# Patient Record
Sex: FEMALE | Race: WHITE | NOT HISPANIC OR LATINO | Employment: FULL TIME | URBAN - METROPOLITAN AREA
[De-identification: names, ages, dates, MRNs, and addresses within clinical notes are randomized per-mention and may not be internally consistent; named-entity substitution may affect disease eponyms.]

---

## 2019-07-24 ENCOUNTER — TELEPHONE (OUTPATIENT)
Dept: SCHEDULING | Facility: IMAGING CENTER | Age: 29
End: 2019-07-24

## 2019-08-21 ENCOUNTER — OFFICE VISIT (OUTPATIENT)
Dept: MEDICAL GROUP | Facility: PHYSICIAN GROUP | Age: 29
End: 2019-08-21
Payer: OTHER GOVERNMENT

## 2019-08-21 VITALS
HEART RATE: 88 BPM | BODY MASS INDEX: 28.25 KG/M2 | TEMPERATURE: 98.7 F | WEIGHT: 180 LBS | RESPIRATION RATE: 18 BRPM | DIASTOLIC BLOOD PRESSURE: 80 MMHG | SYSTOLIC BLOOD PRESSURE: 130 MMHG | HEIGHT: 67 IN | OXYGEN SATURATION: 100 %

## 2019-08-21 DIAGNOSIS — Z30.46 SURVEILLANCE OF PREVIOUSLY PRESCRIBED IMPLANTABLE SUBDERMAL CONTRACEPTIVE: ICD-10-CM

## 2019-08-21 DIAGNOSIS — N92.1 MENORRHAGIA WITH IRREGULAR CYCLE: ICD-10-CM

## 2019-08-21 DIAGNOSIS — G43.109 MIGRAINE WITH AURA AND WITHOUT STATUS MIGRAINOSUS, NOT INTRACTABLE: ICD-10-CM

## 2019-08-21 DIAGNOSIS — Z30.09 BIRTH CONTROL COUNSELING: ICD-10-CM

## 2019-08-21 PROCEDURE — 99203 OFFICE O/P NEW LOW 30 MIN: CPT | Performed by: FAMILY MEDICINE

## 2019-08-21 RX ORDER — SUMATRIPTAN 25 MG/1
25-100 TABLET, FILM COATED ORAL
Qty: 10 TAB | Refills: 1 | Status: SHIPPED | OUTPATIENT
Start: 2019-08-21

## 2019-08-21 RX ORDER — ONDANSETRON 8 MG/1
8 TABLET, ORALLY DISINTEGRATING ORAL EVERY 8 HOURS PRN
COMMUNITY
End: 2019-12-19

## 2019-08-21 RX ORDER — SUMATRIPTAN 25 MG/1
25-100 TABLET, FILM COATED ORAL
COMMUNITY
End: 2019-08-21 | Stop reason: SDUPTHER

## 2019-08-21 SDOH — HEALTH STABILITY: MENTAL HEALTH: HOW OFTEN DO YOU HAVE A DRINK CONTAINING ALCOHOL?: NEVER

## 2019-08-21 ASSESSMENT — PATIENT HEALTH QUESTIONNAIRE - PHQ9: CLINICAL INTERPRETATION OF PHQ2 SCORE: 0

## 2019-08-21 NOTE — PROGRESS NOTES
"cc: Birth control      Subjective:     Ayaka Mars is a 29 y.o. female presenting for the following:     Nexplanon in her left arm that did  in February.  It was first placed but only for birth control but also for her heavy menses.  This has done very well and she stopped having vaginal bleeding.  But over the last few months she has had light but almost continuous vaginal spotting and she would like to have another Nexplanon.  She does have 2 children and she is not sure if she would like to have another in the future.  Negative side effects with the Nexplanon. Patient is not currently sexually active.    Migraine-patient has a long history of migraine since young adulthood.  Her grandmother also gets migraines.  She has tried many different medications while in the  for this.  Some of the prophylactic one such as propranolol would work at first but then would stop working after only a few weeks.  She also was getting Botox injections which would only work for a few weeks as well.  She is currently getting about 1-2 severe migraines with nausea, photophobia, debilitating pain per week but she also has more mild headaches, about 4/week.     Review of systems:  All others reviewed and are negative.       Current Outpatient Medications:   •  ondansetron (ZOFRAN ODT) 8 MG TABLET DISPERSIBLE, Take 8 mg by mouth every 8 hours as needed for Nausea., Disp: , Rfl:   •  SUMAtriptan (IMITREX) 25 MG Tab tablet, Take 1-4 Tabs by mouth Once PRN for Migraine., Disp: 10 Tab, Rfl: 1    Allergies, past medical history, past surgical history, family history, social history reviewed and updated    Objective:     Vitals: /80 (BP Location: Right arm, Patient Position: Sitting, BP Cuff Size: Adult)   Pulse 88   Temp 37.1 °C (98.7 °F) (Temporal)   Resp 18   Ht 1.702 m (5' 7\")   Wt 81.6 kg (180 lb)   SpO2 100%   BMI 28.19 kg/m²   General: Alert, pleasant, NAD  HEENT: Normocephalic.   EOMI, no icterus or " pallor.  Conjunctivae and lids normal. External ears normal. Oropharynx non-erythematous, mucous membranes moist.    Neck supple.  No thyromegaly or masses palpated. No cervical or supraclavicular lymphadenopathy.  Heart: Regular rate and rhythm.  S1 and S2 normal.  No murmurs appreciated.  Respiratory: Normal respiratory effort.  Clear to auscultation bilaterally.  Abdomen: Non-distended, soft  Skin: Warm, dry, no rashes.  Left arm with Nexplanon easily palpable.  Musculoskeletal: Gait is normal.  Moves all extremities well.  Extremities: No leg edema.    Neurological: gait is normal, CN2-12 grossly intact  Psych:  Affect is normal, judgement is good, memory is intact, grooming is appropriate.    Assessment/Plan:     Ayaka was seen today for hospital follow-up and menstrual problem.    Diagnoses and all orders for this visit:    Menorrhagia with irregular cycle/Birth control counseling/Surveillance of previously prescribed implantable subdermal contraceptive: Patient would like a new Nexplanon placed as her previous one is . Will order and schedule to have out with the old and in with the new.       Migraine with aura and without status migrainosus, not intractable: poorly controlled problem. Patient has tried many treatments in the past. Is getting some relief with imitrex currently.   -     REFERRAL TO NEUROLOGY    Other orders  -     SUMAtriptan (IMITREX) 25 MG Tab tablet; Take 1-4 Tabs by mouth Once PRN for Migraine.      Return in about 4 weeks (around 2019), or if symptoms worsen or fail to improve.

## 2019-08-26 ENCOUNTER — TELEPHONE (OUTPATIENT)
Dept: MEDICAL GROUP | Facility: PHYSICIAN GROUP | Age: 29
End: 2019-08-26

## 2019-08-26 NOTE — TELEPHONE ENCOUNTER
Called patient several times for Nexplanon paperwork. No answer and no voicemail. I just the paperwork signed. I will call again tomorrow. I will have the form up front with the PARs.

## 2019-09-17 ENCOUNTER — TELEPHONE (OUTPATIENT)
Dept: MEDICAL GROUP | Facility: PHYSICIAN GROUP | Age: 29
End: 2019-09-17

## 2019-09-17 NOTE — TELEPHONE ENCOUNTER
I received another letter from [employer], they still dont have the training ID. This patient needs her Nexplanon removed and replaced. Do you want to refer to OBGYN? Please advise.

## 2019-09-18 NOTE — TELEPHONE ENCOUNTER
Doroteo, we are sending this patient to Dr. Shay for Nexplanon. Dr. Cardenas can't get the Nexplanon. Can you order it and let me know when to schedule the patient?

## 2019-09-20 NOTE — TELEPHONE ENCOUNTER
I sent you an email with the nexplanon order form. If you can go ahead and get it filled out and get the patient's signature / etc. Once we get the actual nexplanon in we can get her on Dr CAMPBELL's schedule. Thank you.

## 2019-10-01 ENCOUNTER — OFFICE VISIT (OUTPATIENT)
Dept: MEDICAL GROUP | Facility: PHYSICIAN GROUP | Age: 29
End: 2019-10-01
Payer: OTHER GOVERNMENT

## 2019-10-01 VITALS
HEIGHT: 67 IN | SYSTOLIC BLOOD PRESSURE: 110 MMHG | DIASTOLIC BLOOD PRESSURE: 70 MMHG | WEIGHT: 180 LBS | BODY MASS INDEX: 28.25 KG/M2 | TEMPERATURE: 97.4 F | RESPIRATION RATE: 18 BRPM | OXYGEN SATURATION: 98 % | HEART RATE: 88 BPM

## 2019-10-01 DIAGNOSIS — Z87.42 HISTORY OF ABNORMAL CERVICAL PAP SMEAR: ICD-10-CM

## 2019-10-01 DIAGNOSIS — N63.0 BREAST MASS IN FEMALE: ICD-10-CM

## 2019-10-01 DIAGNOSIS — Z80.9 FAMILY HISTORY OF CANCER: ICD-10-CM

## 2019-10-01 DIAGNOSIS — Z30.09 BIRTH CONTROL COUNSELING: ICD-10-CM

## 2019-10-01 DIAGNOSIS — Z98.890 HISTORY OF BREAST BIOPSY: ICD-10-CM

## 2019-10-01 DIAGNOSIS — N92.1 MENORRHAGIA WITH IRREGULAR CYCLE: ICD-10-CM

## 2019-10-01 DIAGNOSIS — N94.6 DYSMENORRHEA: ICD-10-CM

## 2019-10-01 LAB
INT CON NEG: NORMAL
INT CON POS: NORMAL
POC URINE PREGNANCY TEST: NORMAL

## 2019-10-01 PROCEDURE — 99214 OFFICE O/P EST MOD 30 MIN: CPT | Performed by: FAMILY MEDICINE

## 2019-10-01 PROCEDURE — 81025 URINE PREGNANCY TEST: CPT | Performed by: FAMILY MEDICINE

## 2019-10-01 RX ORDER — MEDROXYPROGESTERONE ACETATE 150 MG/ML
150 INJECTION, SUSPENSION INTRAMUSCULAR ONCE
Status: COMPLETED | OUTPATIENT
Start: 2019-10-01 | End: 2019-10-01

## 2019-10-01 RX ADMIN — MEDROXYPROGESTERONE ACETATE 150 MG: 150 INJECTION, SUSPENSION INTRAMUSCULAR at 17:10

## 2019-10-01 NOTE — PROGRESS NOTES
"cc: Sister diagnosed with ovarian cancer.      Subjective:     Ayaka Mars is a 29 y.o. female presenting for the following:     Patient did have a breast mass and had a biopsy with a clip placed and she was supposed to have a follow-up mammogram in 1 year but was lost to follow-up.  She has not had any new symptoms related to this.  She feels well.  No weight loss, night sweats, breast pain.    Family History of cancers: Both grandmothers with breast cancer. Then mother with breast cancer in her 40s. Now, sister diagnosed with ovarian cancer and she is 30 years old.     Patient does have a long history of heavy and irregular menses.  These are very severe and cause her to miss work etc. She has had an abnormal Pap smear in the past and believes she was told to have a repeat pap smear each year. She is due for this.     She is overdue for another Nexplanon.  She did like this form of birth control.  She does have migraine with aura.  She no longer smokes.      Review of systems:  All others reviewed and are negative.       Current Outpatient Medications:   •  ondansetron (ZOFRAN ODT) 8 MG TABLET DISPERSIBLE, Take 8 mg by mouth every 8 hours as needed for Nausea., Disp: , Rfl:   •  SUMAtriptan (IMITREX) 25 MG Tab tablet, Take 1-4 Tabs by mouth Once PRN for Migraine., Disp: 10 Tab, Rfl: 1    Allergies, past medical history, past surgical history, family history, social history reviewed and updated    Objective:     Vitals: /70 (BP Location: Left arm, Patient Position: Sitting, BP Cuff Size: Adult)   Pulse 88   Temp 36.3 °C (97.4 °F) (Temporal)   Resp 18   Ht 1.702 m (5' 7\")   Wt 81.6 kg (180 lb)   SpO2 98%   BMI 28.19 kg/m²   General: Alert, pleasant, NAD  HEENT: Normocephalic.   EOMI, no icterus or pallor.  Conjunctivae and lids normal. External ears normal. Oropharynx non-erythematous, mucous membranes moist.    Neck supple.  No thyromegaly or masses palpated. No cervical or supraclavicular " lymphadenopathy.  Heart: Regular rate and rhythm.  S1 and S2 normal.  No murmurs appreciated.  Respiratory: Normal respiratory effort.  Clear to auscultation bilaterally.  Abdomen: Non-distended, soft  Skin: Warm, dry, no rashes.  Musculoskeletal: Gait is normal.  Moves all extremities well.  Extremities: No leg edema.    Neurological: No tremors, sensation grossly intact,  tone/strength normal, gait is normal, CN2-12 grossly intact  Psych:  Affect is normal, judgement is good, memory is intact, grooming is appropriate.    Assessment/Plan:     Ayaka was seen today for other.    Diagnoses and all orders for this visit:    Patient with menorrhagia, dysmenorrhea.  Will obtain a pelvic ultrasound and ensure normal thyroid function, kidney function, no anemia.  We will also refer to gynecology for this problem and for her history of abnormal Pap smear.  Menorrhagia with irregular cycle  -     REFERRAL TO GYNECOLOGY  -     US-PELVIC COMPLETE (TRANSABDOMINAL/TRANSVAGINAL) (COMBO); Future  -     TSH; Future  -     FREE THYROXINE; Future  -     CBC WITHOUT DIFFERENTIAL; Future  -     Comp Metabolic Panel; Future  Dysmenorrhea  -     REFERRAL TO GYNECOLOGY  -     US-PELVIC COMPLETE (TRANSABDOMINAL/TRANSVAGINAL) (COMBO); Future  History of abnormal cervical Pap smear  -     REFERRAL TO GYNECOLOGY    History of breast biopsy: Patient does have a history of breast biopsy and was told to follow-up with a mammogram yearly that she is due for now.  She does have a clip in place.  -     MA-DIAGNOSTIC MAMMO W/CAD-BILAT; Future    Breast mass in female  -     MA-DIAGNOSTIC MAMMO W/CAD-BILAT; Future    Family history of cancer: Both grandmothers with breast cancer. Then mother with breast cancer in her 40s. Now, sister diagnosed with ovarian cancer and she is 30 years old.   -     REFERRAL TO GENETICS    Birth control counseling: Patient is overdue for a Nexplanon replacement.  Point-of-care pregnancy negative today so will bridge  to her next Nexplanon appointment with Depo-Provera given today.  -     medroxyPROGESTERone (DEPO-PROVERA) injection 150 mg  -     POCT PREGNANCY    Return if symptoms worsen or fail to improve.

## 2019-10-10 ENCOUNTER — HOSPITAL ENCOUNTER (OUTPATIENT)
Dept: LAB | Facility: MEDICAL CENTER | Age: 29
End: 2019-10-10
Attending: FAMILY MEDICINE
Payer: OTHER GOVERNMENT

## 2019-10-10 DIAGNOSIS — N92.1 MENORRHAGIA WITH IRREGULAR CYCLE: ICD-10-CM

## 2019-10-10 LAB
ALBUMIN SERPL BCP-MCNC: 4.8 G/DL (ref 3.2–4.9)
ALBUMIN/GLOB SERPL: 1.4 G/DL
ALP SERPL-CCNC: 70 U/L (ref 30–99)
ALT SERPL-CCNC: 15 U/L (ref 2–50)
ANION GAP SERPL CALC-SCNC: 10 MMOL/L (ref 0–11.9)
AST SERPL-CCNC: 15 U/L (ref 12–45)
BILIRUB SERPL-MCNC: 0.5 MG/DL (ref 0.1–1.5)
BUN SERPL-MCNC: 20 MG/DL (ref 8–22)
CALCIUM SERPL-MCNC: 10.5 MG/DL (ref 8.5–10.5)
CHLORIDE SERPL-SCNC: 105 MMOL/L (ref 96–112)
CO2 SERPL-SCNC: 25 MMOL/L (ref 20–33)
CREAT SERPL-MCNC: 0.93 MG/DL (ref 0.5–1.4)
ERYTHROCYTE [DISTWIDTH] IN BLOOD BY AUTOMATED COUNT: 41.1 FL (ref 35.9–50)
FASTING STATUS PATIENT QL REPORTED: NORMAL
GLOBULIN SER CALC-MCNC: 3.5 G/DL (ref 1.9–3.5)
GLUCOSE SERPL-MCNC: 81 MG/DL (ref 65–99)
HCT VFR BLD AUTO: 49.7 % (ref 37–47)
HGB BLD-MCNC: 15.8 G/DL (ref 12–16)
MCH RBC QN AUTO: 27.1 PG (ref 27–33)
MCHC RBC AUTO-ENTMCNC: 31.8 G/DL (ref 33.6–35)
MCV RBC AUTO: 85.4 FL (ref 81.4–97.8)
PLATELET # BLD AUTO: 298 K/UL (ref 164–446)
PMV BLD AUTO: 9.7 FL (ref 9–12.9)
POTASSIUM SERPL-SCNC: 3.9 MMOL/L (ref 3.6–5.5)
PROT SERPL-MCNC: 8.3 G/DL (ref 6–8.2)
RBC # BLD AUTO: 5.82 M/UL (ref 4.2–5.4)
SODIUM SERPL-SCNC: 140 MMOL/L (ref 135–145)
T4 FREE SERPL-MCNC: 1.52 NG/DL (ref 0.53–1.43)
TSH SERPL DL<=0.005 MIU/L-ACNC: 1.15 UIU/ML (ref 0.38–5.33)
WBC # BLD AUTO: 7 K/UL (ref 4.8–10.8)

## 2019-10-10 PROCEDURE — 80053 COMPREHEN METABOLIC PANEL: CPT

## 2019-10-10 PROCEDURE — 85027 COMPLETE CBC AUTOMATED: CPT

## 2019-10-10 PROCEDURE — 84439 ASSAY OF FREE THYROXINE: CPT

## 2019-10-10 PROCEDURE — 84443 ASSAY THYROID STIM HORMONE: CPT

## 2019-10-10 PROCEDURE — 36415 COLL VENOUS BLD VENIPUNCTURE: CPT

## 2019-10-11 DIAGNOSIS — R79.89 ELEVATED SERUM FREE T4 LEVEL: ICD-10-CM

## 2019-10-11 DIAGNOSIS — R71.8 ELEVATED HEMATOCRIT: ICD-10-CM

## 2019-10-11 NOTE — PROGRESS NOTES
We will repeat CBC thyroid studies all as these had some slight abnormalities.  Made aware by my chart.

## 2019-10-14 ENCOUNTER — HOSPITAL ENCOUNTER (OUTPATIENT)
Dept: RADIOLOGY | Facility: MEDICAL CENTER | Age: 29
End: 2019-10-14
Attending: FAMILY MEDICINE
Payer: OTHER GOVERNMENT

## 2019-10-14 DIAGNOSIS — N63.0 BREAST MASS IN FEMALE: ICD-10-CM

## 2019-10-14 DIAGNOSIS — N92.1 MENORRHAGIA WITH IRREGULAR CYCLE: ICD-10-CM

## 2019-10-14 DIAGNOSIS — N94.6 DYSMENORRHEA: ICD-10-CM

## 2019-10-14 DIAGNOSIS — Z98.890 HISTORY OF BREAST BIOPSY: ICD-10-CM

## 2019-10-14 PROCEDURE — G0279 TOMOSYNTHESIS, MAMMO: HCPCS

## 2019-10-14 PROCEDURE — 76830 TRANSVAGINAL US NON-OB: CPT

## 2019-10-18 DIAGNOSIS — Z80.9 FAMILY HISTORY OF CANCER: ICD-10-CM

## 2019-10-18 DIAGNOSIS — N63.0 BREAST MASS IN FEMALE: ICD-10-CM

## 2019-10-18 NOTE — PROGRESS NOTES
Patient with a history of breast biopsy and did recently have a repeat mammogram.  Given her strong family history she has also been referred to genetics.  Also, there was a recommendation for patient to have a screening breast MRI.  Order placed and patient messaged on [email or web address].

## 2019-10-21 ENCOUNTER — TELEPHONE (OUTPATIENT)
Dept: HEMATOLOGY ONCOLOGY | Facility: MEDICAL CENTER | Age: 29
End: 2019-10-21

## 2019-10-21 NOTE — TELEPHONE ENCOUNTER
Patient called to schedule her genetics appt. Scheduled pt for 11/5. Informed pt not to bring small children to the appt.

## 2019-10-26 ENCOUNTER — HOSPITAL ENCOUNTER (EMERGENCY)
Facility: MEDICAL CENTER | Age: 29
End: 2019-10-26
Attending: EMERGENCY MEDICINE
Payer: OTHER GOVERNMENT

## 2019-10-26 VITALS
HEART RATE: 85 BPM | DIASTOLIC BLOOD PRESSURE: 61 MMHG | HEIGHT: 67 IN | BODY MASS INDEX: 33.01 KG/M2 | OXYGEN SATURATION: 96 % | TEMPERATURE: 99.2 F | RESPIRATION RATE: 18 BRPM | SYSTOLIC BLOOD PRESSURE: 100 MMHG | WEIGHT: 210.32 LBS

## 2019-10-26 DIAGNOSIS — G43.109 MIGRAINE WITH AURA AND WITHOUT STATUS MIGRAINOSUS, NOT INTRACTABLE: ICD-10-CM

## 2019-10-26 PROCEDURE — 700111 HCHG RX REV CODE 636 W/ 250 OVERRIDE (IP): Performed by: EMERGENCY MEDICINE

## 2019-10-26 PROCEDURE — 99284 EMERGENCY DEPT VISIT MOD MDM: CPT

## 2019-10-26 PROCEDURE — 700111 HCHG RX REV CODE 636 W/ 250 OVERRIDE (IP)

## 2019-10-26 PROCEDURE — 700105 HCHG RX REV CODE 258: Performed by: EMERGENCY MEDICINE

## 2019-10-26 PROCEDURE — 96375 TX/PRO/DX INJ NEW DRUG ADDON: CPT

## 2019-10-26 PROCEDURE — 96374 THER/PROPH/DIAG INJ IV PUSH: CPT

## 2019-10-26 RX ORDER — KETOROLAC TROMETHAMINE 30 MG/ML
15 INJECTION, SOLUTION INTRAMUSCULAR; INTRAVENOUS ONCE
Status: COMPLETED | OUTPATIENT
Start: 2019-10-26 | End: 2019-10-26

## 2019-10-26 RX ORDER — DIPHENHYDRAMINE HYDROCHLORIDE 50 MG/ML
50 INJECTION INTRAMUSCULAR; INTRAVENOUS ONCE
Status: COMPLETED | OUTPATIENT
Start: 2019-10-26 | End: 2019-10-26

## 2019-10-26 RX ORDER — DEXAMETHASONE SODIUM PHOSPHATE 4 MG/ML
4 INJECTION, SOLUTION INTRA-ARTICULAR; INTRALESIONAL; INTRAMUSCULAR; INTRAVENOUS; SOFT TISSUE ONCE
Status: COMPLETED | OUTPATIENT
Start: 2019-10-26 | End: 2019-10-26

## 2019-10-26 RX ORDER — SODIUM CHLORIDE 9 MG/ML
1000 INJECTION, SOLUTION INTRAVENOUS ONCE
Status: COMPLETED | OUTPATIENT
Start: 2019-10-26 | End: 2019-10-26

## 2019-10-26 RX ORDER — KETOROLAC TROMETHAMINE 30 MG/ML
INJECTION, SOLUTION INTRAMUSCULAR; INTRAVENOUS
Status: COMPLETED
Start: 2019-10-26 | End: 2019-10-26

## 2019-10-26 RX ORDER — PROCHLORPERAZINE EDISYLATE 5 MG/ML
10 INJECTION INTRAMUSCULAR; INTRAVENOUS ONCE
Status: COMPLETED | OUTPATIENT
Start: 2019-10-26 | End: 2019-10-26

## 2019-10-26 RX ADMIN — DIPHENHYDRAMINE HYDROCHLORIDE 50 MG: 50 INJECTION INTRAMUSCULAR; INTRAVENOUS at 21:14

## 2019-10-26 RX ADMIN — SODIUM CHLORIDE 1000 ML: 9 INJECTION, SOLUTION INTRAVENOUS at 21:14

## 2019-10-26 RX ADMIN — PROCHLORPERAZINE EDISYLATE 10 MG: 5 INJECTION INTRAMUSCULAR; INTRAVENOUS at 21:13

## 2019-10-26 RX ADMIN — DEXAMETHASONE SODIUM PHOSPHATE 4 MG: 4 INJECTION, SOLUTION INTRA-ARTICULAR; INTRALESIONAL; INTRAMUSCULAR; INTRAVENOUS; SOFT TISSUE at 21:14

## 2019-10-26 RX ADMIN — KETOROLAC TROMETHAMINE 15 MG: 30 INJECTION, SOLUTION INTRAMUSCULAR; INTRAVENOUS at 21:14

## 2019-10-26 RX ADMIN — KETOROLAC TROMETHAMINE 15 MG: 30 INJECTION, SOLUTION INTRAMUSCULAR at 21:14

## 2019-10-27 NOTE — ED PROVIDER NOTES
"ED Provider Note    CHIEF COMPLAINT  Chief Complaint   Patient presents with   • Migraine       HPI  Ayaka Mars is a 29 y.o. female who presents to the emergency department chief complaint of a migraine this been lasting for the last 11 days.  She states that she has had some mild relief at times but is always come back quite strongly.  Is been associated with nausea.  She feels like she is got a break in her head and has been hit by a bus.  This is typical for her migraines is also associated with light and sound sensitivities which have been present as well as a mild aura where she sees spots floating in and out.  She did take a sumatriptan 4 days ago and then again yesterday without relief of her symptoms.  She denies any fevers or chills, vomiting vision changes weakness numbness tingling or anything atypical beyond the length of this migraine    REVIEW OF SYSTEMS  Positives as above. Pertinent negatives include fevers chills vision changes vomiting neck stiffness confusion weakness numbness tingling  All other review of systems are negative    PAST MEDICAL HISTORY   has a past medical history of Anxiety, Migraine, and PTSD (post-traumatic stress disorder).    SOCIAL HISTORY  Social History     Tobacco Use   • Smoking status: Former Smoker   • Smokeless tobacco: Never Used   Substance and Sexual Activity   • Alcohol use: Never     Frequency: Never   • Drug use: Never   • Sexual activity: Not on file       SURGICAL HISTORY  patient denies any surgical history    CURRENT MEDICATIONS  Home Medications    **Home medications have not yet been reviewed for this encounter**         ALLERGIES  No Known Allergies    PHYSICAL EXAM  VITAL SIGNS: /85   Pulse 100   Temp 37.3 °C (99.2 °F) (Temporal)   Resp 18   Ht 1.702 m (5' 7\")   Wt 95.4 kg (210 lb 5.1 oz)   SpO2 97%   BMI 32.94 kg/m²     Pulse ox interpretation: I interpret this pulse ox as normal.  Constitutional: Alert in no apparent distress.  HENT: " "Normocephalic atraumatic, MMM  Eyes: PER, Conjunctiva normal, Non-icteric.   Neck: Normal range of motion, No tenderness, Supple, No stridor.   Cardiovascular: Regular rate and rhythm, no murmurs.   Thorax & Lungs: Normal breath sounds, No respiratory distress, No wheezing, No chest tenderness.   Extremities: Intact distal pulses, No edema, No tenderness, No cyanosis   Neurologic: Alert and oriented x3, No focal deficits noted.       DIFFERENTIAL DIAGNOSIS AND WORK UP PLAN    This is a 29 y.o. female who presents with history and physical likely consistent with a migraine headache is a similar migraines in the past it was not abrupt onset low concern for subarachnoid hemorrhage or intracranial idiopathic hypertension, she will be treated with IV fluids for migraine cocktail Toradol Compazine Benadryl and Decadron especially with the length of the migraine to help recurrence.      COURSE & MEDICAL DECISION MAKING  Pertinent Labs & Imaging studies reviewed. (See chart for details)    9:48 PM  Patient is doing well feeling much better after her medication she is been care home through her fluids and feeling greatly improved.  The fluids were given secondary to migraine treatment.  She feels comfortable going home she understands return precautions for any new or worsening issues or uncontrolled pain at home.    /61   Pulse 85   Temp 37.3 °C (99.2 °F) (Temporal)   Resp 18   Ht 1.702 m (5' 7\")   Wt 95.4 kg (210 lb 5.1 oz)   SpO2 96%   BMI 32.94 kg/m²   The patient will return for new or worsening symptoms and is stable at the time of discharge.      DISPOSITION:  Patient will be discharged home in stable condition.    FOLLOW UP:  Brianne Cardenas M.D.  [address] [facility]  [address]  [place] [address] [phone]    Schedule an appointment as soon as possible for a visit       [facility], Emergency Dept  [address] [facility]  [place] [address] [phone]    If symptoms " worsen      OUTPATIENT MEDICATIONS:  New Prescriptions    No medications on file       FINAL IMPRESSION  1. Migraine with aura and without status migrainosus, not intractable             Electronically signed by: Renee Ye, 10/26/2019 8:40 PM    This dictation has been created using voice recognition software and/or scribes. The accuracy of the dictation is limited by the abilities of the software and the expertise of the scribes. I expect there may be some errors of grammar and possibly content. I made every attempt to manually correct the errors within my dictation. However, errors related to voice recognition software and/or scribes may still exist and should be interpreted within the appropriate context.

## 2019-10-27 NOTE — ED TRIAGE NOTES
Migraine h/a x 7 days. Hx of migraines. Some sinusitus. Some vision disturbance and photophobia. Tried sumatriptan.

## 2019-10-30 ENCOUNTER — GYNECOLOGY VISIT (OUTPATIENT)
Dept: OBGYN | Facility: CLINIC | Age: 29
End: 2019-10-30
Payer: OTHER GOVERNMENT

## 2019-10-30 VITALS — WEIGHT: 210 LBS | BODY MASS INDEX: 32.89 KG/M2 | DIASTOLIC BLOOD PRESSURE: 66 MMHG | SYSTOLIC BLOOD PRESSURE: 112 MMHG

## 2019-10-30 DIAGNOSIS — N92.1 MENOMETRORRHAGIA: ICD-10-CM

## 2019-10-30 PROCEDURE — 99203 OFFICE O/P NEW LOW 30 MIN: CPT | Performed by: OBSTETRICS & GYNECOLOGY

## 2019-10-30 NOTE — PROGRESS NOTES
Chief complaint; new patient    Ayaka Mars is a 29 y.o.  who presents complaining of irregular menstrual cycles.  Currently the patient has Nexplanon in her left arm which is 3 years old and is scheduled to be removed by her primary care provider.  The patient also had transvaginal ultrasound and mammogram due to strong family history of breast and ovarian cancer.  Patient is scheduled for genetic testing for BRCA arranged by her primary care provider.  The patient complains of irregular vaginal bleeding she currently has Depo-Provera started on the first week of 2019..  2 children but would like to conceive again.    Review of systems; denies fever chills abdominal pain, denies chest pain shortness of breath or urinary symptoms  Past medical history-  Past Medical History:   Diagnosis Date   • Anxiety    • Migraine    • PTSD (post-traumatic stress disorder)      Past surgical history-History reviewed. No pertinent surgical history.  Allergies-Patient has no known allergies.  Medications-  Current Outpatient Medications on File Prior to Visit   Medication Sig Dispense Refill   • ondansetron (ZOFRAN ODT) 8 MG TABLET DISPERSIBLE Take 8 mg by mouth every 8 hours as needed for Nausea.     • SUMAtriptan (IMITREX) 25 MG Tab tablet Take 1-4 Tabs by mouth Once PRN for Migraine. 10 Tab 1     No current facility-administered medications on file prior to visit.      Social history-  Social History     Socioeconomic History   • Marital status: Legally      Spouse name: Not on file   • Number of children: Not on file   • Years of education: Not on file   • Highest education level: Not on file   Occupational History   • Not on file   Social Needs   • Financial resource strain: Not on file   • Food insecurity:     Worry: Not on file     Inability: Not on file   • Transportation needs:     Medical: Not on file     Non-medical: Not on file   Tobacco Use   • Smoking status: Former Smoker   • Smokeless  tobacco: Never Used   Substance and Sexual Activity   • Alcohol use: Never     Frequency: Never   • Drug use: Never   • Sexual activity: Not on file   Lifestyle   • Physical activity:     Days per week: Not on file     Minutes per session: Not on file   • Stress: Not on file   Relationships   • Social connections:     Talks on phone: Not on file     Gets together: Not on file     Attends Hinduism service: Not on file     Active member of club or organization: Not on file     Attends meetings of clubs or organizations: Not on file     Relationship status: Not on file   • Intimate partner violence:     Fear of current or ex partner: Not on file     Emotionally abused: Not on file     Physically abused: Not on file     Forced sexual activity: Not on file   Other Topics Concern   • Not on file   Social History Narrative   • Not on file     Past Family History-no history of breast or ovarian cancer    Physical examination;  Alert and oriented x3  General a thin well-developed well-nourished female in no apparent distress  Vitals:    10/30/19 0853   BP: 112/66   Weight: 95.3 kg (210 lb)     Skin is warm and dry  Neck-supple  HEENT-head-atraumatic, normocephalic, EOMI, PERRLA  Cardiovascular-regular rate and rhythm, normal S1-S2, no murmurs or gallops  Lungs-clear to auscultation bilaterally  Back-negative CVA tenderness  Abdomen-nondistended positive bowel sounds soft nontender no masses or hepatosplenomegaly  Pelvic examination;  External genitalia-no visible lesions   Vagina-no blood or discharge  Cervix-no gross lesions  Uterus-normal size and shape, nontender  Adnexa without mass or tenderness  Extremities without cyanosis clubbing or edema  Neurologic exam grossly intact    Left arm-Nexplanon just underneath the skin above her biceps muscle    Impression;  Menometrorrhagia likely secondary to Nexplanon    Plan;  Patient needs Nexplanon removed  We will place Mirena IUD in January-referral placed  Reviewed results of  ultrasound with the patient which are normal  Reviewed mammogram results with patient which is also normal.  Patient scheduled for genetic testing.    Discussed Mirena risks benefits alternatives all questions answered in detail  Discussed ultrasound finding of inhomogeneous myometrium which is likely very normal finding the risk of adenomyosis is very small given her clinical history, physical examination and essentially normal ultrasound.  We discussed this in detail and all questions were answered.    35  Minutes spent with the patient in face-to-face contact, greater than 50% of the time spent on counseling and coordination of care. All questions answered in detail.

## 2019-12-05 ENCOUNTER — OFFICE VISIT (OUTPATIENT)
Dept: URGENT CARE | Facility: PHYSICIAN GROUP | Age: 29
End: 2019-12-05
Payer: OTHER GOVERNMENT

## 2019-12-05 VITALS
RESPIRATION RATE: 20 BRPM | HEIGHT: 67 IN | WEIGHT: 211 LBS | OXYGEN SATURATION: 96 % | TEMPERATURE: 98.1 F | SYSTOLIC BLOOD PRESSURE: 116 MMHG | HEART RATE: 104 BPM | BODY MASS INDEX: 33.12 KG/M2 | DIASTOLIC BLOOD PRESSURE: 68 MMHG

## 2019-12-05 DIAGNOSIS — J06.9 VIRAL URI WITH COUGH: ICD-10-CM

## 2019-12-05 PROCEDURE — 99214 OFFICE O/P EST MOD 30 MIN: CPT | Performed by: PHYSICIAN ASSISTANT

## 2019-12-05 RX ORDER — GUAIFENESIN 600 MG/1
600 TABLET, EXTENDED RELEASE ORAL EVERY 12 HOURS
Qty: 28 TAB | Refills: 0 | Status: SHIPPED | OUTPATIENT
Start: 2019-12-05 | End: 2019-12-05

## 2019-12-05 RX ORDER — GUAIFENESIN 600 MG/1
600 TABLET, EXTENDED RELEASE ORAL EVERY 12 HOURS
Qty: 28 TAB | Refills: 0 | Status: SHIPPED | OUTPATIENT
Start: 2019-12-05 | End: 2019-12-19

## 2019-12-05 RX ORDER — BENZONATATE 100 MG/1
100 CAPSULE ORAL 3 TIMES DAILY PRN
Qty: 30 CAP | Refills: 0 | Status: SHIPPED | OUTPATIENT
Start: 2019-12-05 | End: 2019-12-05

## 2019-12-05 RX ORDER — FLUTICASONE PROPIONATE 50 MCG
1 SPRAY, SUSPENSION (ML) NASAL DAILY
Qty: 16 G | Refills: 0 | Status: SHIPPED | OUTPATIENT
Start: 2019-12-05 | End: 2019-12-19

## 2019-12-05 RX ORDER — FLUTICASONE PROPIONATE 50 MCG
1 SPRAY, SUSPENSION (ML) NASAL DAILY
Qty: 16 G | Refills: 0 | Status: SHIPPED | OUTPATIENT
Start: 2019-12-05 | End: 2019-12-05

## 2019-12-05 RX ORDER — BENZONATATE 100 MG/1
100 CAPSULE ORAL 3 TIMES DAILY PRN
Qty: 30 CAP | Refills: 0 | Status: SHIPPED | OUTPATIENT
Start: 2019-12-05 | End: 2019-12-19

## 2019-12-05 RX ORDER — PROMETHAZINE HYDROCHLORIDE AND CODEINE PHOSPHATE 6.25; 1 MG/5ML; MG/5ML
5 SYRUP ORAL 4 TIMES DAILY PRN
Qty: 120 ML | Refills: 0 | Status: SHIPPED | OUTPATIENT
Start: 2019-12-05 | End: 2019-12-12

## 2019-12-05 ASSESSMENT — ENCOUNTER SYMPTOMS
COUGH: 1
SORE THROAT: 1
SWOLLEN GLANDS: 0
SINUS PAIN: 0
RHINORRHEA: 0

## 2019-12-05 NOTE — LETTER
December 5, 2019    To Whom It May Concern:         This is confirmation that Ayaka Mars attended her scheduled appointment with Manish Chiu P.A.-C. on 12/05/19. She may return to work without restriction on 12/9/19         If you have any questions please do not hesitate to call me at the phone number listed below.    Sincerely,          Manish Chiu P.A.-C.  [phone]

## 2019-12-19 ENCOUNTER — OFFICE VISIT (OUTPATIENT)
Dept: MEDICAL GROUP | Facility: PHYSICIAN GROUP | Age: 29
End: 2019-12-19
Payer: OTHER GOVERNMENT

## 2019-12-19 VITALS
WEIGHT: 211 LBS | DIASTOLIC BLOOD PRESSURE: 78 MMHG | HEIGHT: 67 IN | BODY MASS INDEX: 33.12 KG/M2 | RESPIRATION RATE: 18 BRPM | HEART RATE: 78 BPM | TEMPERATURE: 97 F | SYSTOLIC BLOOD PRESSURE: 118 MMHG | OXYGEN SATURATION: 97 %

## 2019-12-19 DIAGNOSIS — Z30.09 BIRTH CONTROL COUNSELING: ICD-10-CM

## 2019-12-19 DIAGNOSIS — Z30.46 NEXPLANON REMOVAL: ICD-10-CM

## 2019-12-19 PROCEDURE — 11982 REMOVE DRUG IMPLANT DEVICE: CPT | Performed by: FAMILY MEDICINE

## 2019-12-19 PROCEDURE — 99213 OFFICE O/P EST LOW 20 MIN: CPT | Mod: 25 | Performed by: FAMILY MEDICINE

## 2019-12-19 NOTE — PROGRESS NOTES
"cc: nexplanon removal       Subjective:     Ayaka Mars is a 29 y.o. female presenting for the following:     Birth control: Patient does have a Nexplanon in place that she would like removed today.  It was  about 1 year ago.  She then did have a Depo-Provera shot about 3 months ago.  She has had very irregular periods and vaginal bleeding/spotting for the last 6 months.  Her partner is long distance so discussed different birth control options today but she is happy to use condoms while we await a Nexplanon approval and to go off of all hormonal birth control today.    Review of systems:  All others reviewed and are negative.       Current Outpatient Medications:   •  SUMAtriptan (IMITREX) 25 MG Tab tablet, Take 1-4 Tabs by mouth Once PRN for Migraine. (Patient not taking: Reported on 2019), Disp: 10 Tab, Rfl: 1    Allergies, past medical history, past surgical history, family history, social history reviewed and updated    Objective:     Vitals: /78 (BP Location: Left arm, Patient Position: Sitting, BP Cuff Size: Large adult)   Pulse 78   Temp 36.1 °C (97 °F) (Temporal)   Resp 18   Ht 1.702 m (5' 7\")   Wt 95.7 kg (211 lb)   SpO2 97%   BMI 33.05 kg/m²   General: Alert, pleasant, NAD  HEENT: Normocephalic.   EOMI, no icterus or pallor.   Heart: Regular rate   Respiratory: Normal respiratory effort.      Nexplanon Removal from left arm:   Risks of procedure discussed including pain, reaction to anesthetic, infection, scarring, damage to surrounding structures, and possible need for further procedure.   Area cleaned with alcohol swab prior to lidocaine with epinephrine being injected subcutaneously.   ~3mm incision made and nexplanon removed with forceps and shown to patient.   Hemostasis achieved with pressure and steri-stirps. Bandage applied.   Minimal blood loss. No complications. Patient tolerated procedure well. Aftercare discussed.       Assessment/Plan:     Ayaka was seen " today for menstrual problem.    Diagnoses and all orders for this visit:    Birth control counseling: Did discuss with patient that she is not a combined oral contraceptive pill candidate due to her history of migraine with aura.  She would like to go off of all hormonal birth control currently and use condoms only.  We will submit a request for another Nexplanon, she would like to have another one placed in a few months time.    Nexplanon removal: Completed today without complication.  Patient to return to clinic if any signs of infection or not healing as expected.        Return if symptoms worsen or fail to improve.

## 2020-01-29 ENCOUNTER — OFFICE VISIT (OUTPATIENT)
Dept: MEDICAL GROUP | Facility: PHYSICIAN GROUP | Age: 30
End: 2020-01-29
Payer: OTHER GOVERNMENT

## 2020-01-29 VITALS
WEIGHT: 214 LBS | DIASTOLIC BLOOD PRESSURE: 62 MMHG | HEIGHT: 67 IN | OXYGEN SATURATION: 95 % | TEMPERATURE: 98.8 F | HEART RATE: 92 BPM | BODY MASS INDEX: 33.59 KG/M2 | SYSTOLIC BLOOD PRESSURE: 118 MMHG

## 2020-01-29 DIAGNOSIS — Z30.017 NEXPLANON INSERTION: ICD-10-CM

## 2020-01-29 LAB
INT CON NEG: NEGATIVE
INT CON POS: POSITIVE
POC URINE PREGNANCY TEST: NEGATIVE

## 2020-01-29 PROCEDURE — 11981 INSERTION DRUG DLVR IMPLANT: CPT | Performed by: FAMILY MEDICINE

## 2020-01-29 PROCEDURE — 81025 URINE PREGNANCY TEST: CPT | Performed by: FAMILY MEDICINE

## 2020-01-29 ASSESSMENT — PATIENT HEALTH QUESTIONNAIRE - PHQ9: CLINICAL INTERPRETATION OF PHQ2 SCORE: 0

## 2020-01-29 NOTE — PROGRESS NOTES
"        Nexplanon Insertion Procedure  Ayaka Mars is a 29 y.o.  female here today for Nexplanon insertion. Ayaka Mars is currently menstruating and her pregnancy test is negative. Her previous contraceptive method was Nexplanon.    Past Medical History:   Diagnosis Date   • Anxiety    • Migraine    • PTSD (post-traumatic stress disorder)      History reviewed. No pertinent surgical history.    No Known Allergies  Current Outpatient Medications   Medication Sig Dispense Refill   • SUMAtriptan (IMITREX) 25 MG Tab tablet Take 1-4 Tabs by mouth Once PRN for Migraine. 10 Tab 1     No current facility-administered medications for this visit.        OBJECTIVE: /62 (BP Location: Right arm, Patient Position: Sitting, BP Cuff Size: Adult)   Pulse 92   Temp 37.1 °C (98.8 °F) (Temporal)   Ht 1.702 m (5' 7\")   Wt 97.1 kg (214 lb)   SpO2 95%   BMI 33.52 kg/m²      Left upper extremity with no gross deformity and grossly normal range of motion.    She has been counseled regarding contraception options. She is interested in the Nexplanon device. She has previously been consented and has reviewed counseling materials and given time to review the information and ask questions. I reviewed Nexplanon with her again today prior to insertion along with the consent.  The patient is able to voice understanding that the Nexplanon is used for birth control to keep her from getting pregnant. She understands that no contraceptive method is 100% effective including the Nexplanon. She understands that this is a progesterone medication insert.  ?  The patient knows to have the Nexplanon removed after three years and that it could be removed sooner if so desired. She is informed of the risks of the insertion including bleeding, infection, bruising and scarring. She is also aware of the same related to removal. She was also made aware of the possibility of difficulty with removal including the possible need for " surgical removal if necessary.    Nexplanon with lot #[id number] is inserted in the patient's left arm in the usual fashion after Betadine prep and Xylocaine used for local anesthetic in a field block. The implant was present in the needle before insertion. The applicator was held upright when the needle cap was removed right before insertion. The grooved tip of the obturator was visible in the needle after insertion. Implant placement was confirmed by palpation by myself and Ayaka Mars after insertion. The patient tolerated the procedure well and no complications were experienced. Bandage was applied. Post procedure instructions were reviewed. Patient given wallet card with lot number and removal date.    The patient will continue to follow-up for gyn care annually or as indicated and for p.r.n. reasons.     Warnings and instructions were given to Ayaka Mars. Her questions were answered. The patient voices understanding.    Abbie Shay D.O.  1/29/2020

## 2020-06-16 ENCOUNTER — PATIENT MESSAGE (OUTPATIENT)
Dept: MEDICAL GROUP | Facility: PHYSICIAN GROUP | Age: 30
End: 2020-06-16

## 2020-06-16 DIAGNOSIS — L60.0 INGROWN TOENAIL: ICD-10-CM

## 2021-03-15 PROBLEM — F43.23 ADJUSTMENT DISORDER WITH MIXED ANXIETY AND DEPRESSED MOOD: Status: ACTIVE | Noted: 2021-03-15

## 2022-07-29 ENCOUNTER — NON-PROVIDER VISIT (OUTPATIENT)
Dept: URGENT CARE | Facility: PHYSICIAN GROUP | Age: 32
End: 2022-07-29

## 2022-07-29 DIAGNOSIS — Z02.1 PRE-EMPLOYMENT DRUG SCREENING: ICD-10-CM

## 2022-07-29 PROCEDURE — 8907 PR URINE COLLECT ONLY: Performed by: FAMILY MEDICINE
